# Patient Record
Sex: FEMALE | Race: WHITE | NOT HISPANIC OR LATINO | URBAN - METROPOLITAN AREA
[De-identification: names, ages, dates, MRNs, and addresses within clinical notes are randomized per-mention and may not be internally consistent; named-entity substitution may affect disease eponyms.]

---

## 2020-07-25 ENCOUNTER — TELEPHONE ENCOUNTER (OUTPATIENT)
Dept: URBAN - METROPOLITAN AREA CLINIC 13 | Facility: CLINIC | Age: 65
End: 2020-07-25

## 2020-07-25 RX ORDER — CARISOPRODOL 250 MG/1
TAKE 1 TABLET BEDTIME TABLET ORAL
Refills: 0 | OUTPATIENT
Start: 2007-08-30 | End: 2008-07-28

## 2020-07-25 RX ORDER — POLYETHYLENE GLYCOL 3350 17 G/DOSE
MIX 17 GRAMS IN 8 OUNCES OF WATER AND DRINK ONCE DAILY POWDER (GRAM) ORAL
Refills: 0 | OUTPATIENT
End: 2015-04-03

## 2020-07-25 RX ORDER — LORAZEPAM 0.5 MG/1
TAKE 1 TABLET BY MOUTH TWICE A DAY AS NEEDED FOR ANXIETY TABLET ORAL
Qty: 30 | Refills: 0 | OUTPATIENT
Start: 2011-11-22 | End: 2015-04-03

## 2020-07-25 RX ORDER — PREDNISONE 10 MG/1
TABLET ORAL
Qty: 32 | Refills: 0 | OUTPATIENT
Start: 2015-01-02 | End: 2015-06-09

## 2020-07-25 RX ORDER — LANSOPRAZOLE 30 MG/1
TAKE 1 CAPSULE DAILY CAPSULE, DELAYED RELEASE ORAL
Refills: 0 | OUTPATIENT
End: 2015-06-09

## 2020-07-25 RX ORDER — TRAMADOL HYDROCHLORIDE AND ACETAMINOPHEN 37.5; 325 MG/1; MG/1
TAKE 1-2 TABLETS BY MOUTH EVERY 4-6 HOURS AS NEEDED (DO NOT TAKE MORE TABLET, FILM COATED ORAL
Qty: 90 | Refills: 0 | OUTPATIENT
Start: 2011-06-20 | End: 2015-04-03

## 2020-07-26 ENCOUNTER — TELEPHONE ENCOUNTER (OUTPATIENT)
Dept: URBAN - METROPOLITAN AREA CLINIC 13 | Facility: CLINIC | Age: 65
End: 2020-07-26

## 2020-07-26 RX ORDER — ALENDRONATE SODIUM AND CHOLECALCIFEROL 70; 2800 MG/1; [IU]/1
TABLET ORAL
Qty: 4 | Refills: 0 | Status: ACTIVE | COMMUNITY
Start: 2011-09-27

## 2020-07-26 RX ORDER — PHENAZOPYRIDINE HYDROCHLORIDE 100 MG/1
TAKE ONE CAPSULE BY MOUTH EVERY DAY TABLET, COATED ORAL
Qty: 30 | Refills: 0 | Status: ACTIVE | COMMUNITY
Start: 2011-07-12

## 2020-07-26 RX ORDER — ASPIRIN 325 MG/1
TABLET ORAL
Qty: 60 | Refills: 0 | Status: ACTIVE | COMMUNITY
Start: 2011-12-26

## 2020-07-26 RX ORDER — AMOXICILLIN 500 MG/1
TAKE 1 CAPSULE TWICE A DAY FOR 10 DAYS CAPSULE ORAL
Qty: 20 | Refills: 0 | Status: ACTIVE | COMMUNITY
Start: 2011-02-01

## 2020-07-26 RX ORDER — ALENDRONATE SODIUM 70 MG/1
TABLET ORAL
Qty: 4 | Refills: 0 | Status: ACTIVE | COMMUNITY
Start: 2011-12-05

## 2020-07-26 RX ORDER — FAMOTIDINE 20 MG/1
TAKE 1 TABLET TWICE DAILY TABLET ORAL
Refills: 0 | Status: ACTIVE | COMMUNITY
Start: 2011-05-07

## 2020-07-26 RX ORDER — FLUCONAZOLE 150 MG/1
TABLET ORAL
Qty: 3 | Refills: 0 | Status: ACTIVE | COMMUNITY
Start: 2014-06-10

## 2020-07-26 RX ORDER — ALBUTEROL SULFATE 90 UG/1
USE 2 PUFFS 4 TIMES A DAY AS NEEDED AEROSOL, METERED RESPIRATORY (INHALATION)
Qty: 9 | Refills: 0 | Status: ACTIVE | COMMUNITY
Start: 2011-09-06

## 2020-07-26 RX ORDER — ALENDRONATE SODIUM AND CHOLECALCIFEROL 70; 2800 MG/1; [IU]/1
TABLET ORAL
Qty: 4 | Refills: 0 | Status: ACTIVE | COMMUNITY
Start: 2011-08-31

## 2020-07-26 RX ORDER — AMOXICILLIN 400 MG/5ML
GIVE 6 ML BY MOUTH TWICE A DAY FOR 10 DAYS (DISCARD REMAINDER) POWDER, FOR SUSPENSION ORAL
Qty: 150 | Refills: 0 | Status: ACTIVE | COMMUNITY
Start: 2011-02-08

## 2020-07-26 RX ORDER — ESTRADIOL 0.5 MG/1
TAKE 1 TABLET DAILY TABLET ORAL
Refills: 0 | Status: ACTIVE | COMMUNITY
Start: 2014-06-11

## 2020-07-26 RX ORDER — FAMOTIDINE 20 MG/1
TABLET ORAL
Qty: 90 | Refills: 0 | Status: ACTIVE | COMMUNITY
Start: 2011-02-08

## 2020-07-26 RX ORDER — TRAMADOL HYDROCHLORIDE AND ACETAMINOPHEN 37.5; 325 MG/1; MG/1
TAKE 1-2 TABLETS BY MOUTH EVERY 4-6 HOURS AS NEEDED (DO NOT TAKE MORE TABLET, FILM COATED ORAL
Qty: 90 | Refills: 0 | Status: ACTIVE | COMMUNITY
Start: 2011-06-20

## 2020-07-26 RX ORDER — WARFARIN 2.5 MG/1
TAKE 1 TABLET BY MOUTH EVERY DAY AT 4 PM TABLET ORAL
Qty: 15 | Refills: 0 | Status: ACTIVE | COMMUNITY
Start: 2010-12-31

## 2020-07-26 RX ORDER — ALENDRONATE SODIUM 70 MG/1
TAKE 1 TABLET ONCE WEEKLY TABLET ORAL
Refills: 0 | Status: ACTIVE | COMMUNITY

## 2020-07-26 RX ORDER — VERAPAMIL HYDROCHLORIDE 80 MG/1
TAKE 1 TABLET 3 TIMES DAILY TABLET ORAL
Refills: 0 | Status: ACTIVE | COMMUNITY
Start: 2014-07-01

## 2020-07-26 RX ORDER — ZOLPIDEM TARTRATE 10 MG/1
TAKE 1 TABLET BY MOUTH AT BEDTIME AS NEEDED TABLET, FILM COATED ORAL
Qty: 30 | Refills: 0 | Status: ACTIVE | COMMUNITY
Start: 2011-06-20

## 2020-07-26 RX ORDER — PREDNISONE 10 MG/1
TABLET ORAL
Qty: 32 | Refills: 0 | Status: ACTIVE | COMMUNITY
Start: 2015-01-02

## 2020-07-26 RX ORDER — ZOLPIDEM TARTRATE 10 MG/1
TAKE 1 TABLET DAILY AT BEDTIME TABLET, FILM COATED ORAL
Refills: 0 | Status: ACTIVE | COMMUNITY

## 2020-07-26 RX ORDER — CYANOCOBALAMIN 1000 UG/ML
ADMINISTER IM INJECTION 1ML EVERY 2 WEEKS INJECTION INTRAMUSCULAR; SUBCUTANEOUS
Refills: 0 | Status: ACTIVE | COMMUNITY

## 2020-07-26 RX ORDER — BUTALBITAL, ACETAMINOPHEN, AND CAFFEINE 50; 325; 40 MG/1; MG/1; MG/1
TABLET ORAL
Qty: 30 | Refills: 0 | Status: ACTIVE | COMMUNITY
Start: 2014-09-25

## 2020-07-26 RX ORDER — CYCLOSPORINE 0.5 MG/ML
EMULSION OPHTHALMIC
Qty: 60 | Refills: 0 | Status: ACTIVE | COMMUNITY
Start: 2011-09-01

## 2020-07-26 RX ORDER — OXYCODONE AND ACETAMINOPHEN 10; 325 MG/1; MG/1
TABLET ORAL
Qty: 60 | Refills: 0 | Status: ACTIVE | COMMUNITY
Start: 2011-12-26

## 2020-07-26 RX ORDER — OXYCODONE AND ACETAMINOPHEN 5; 325 MG/1; MG/1
TAKE 1 OR 2 TABLETS BY MOUTH EVERY 6 TO 8 HOURS TABLET ORAL
Qty: 40 | Refills: 0 | Status: ACTIVE | COMMUNITY
Start: 2012-01-05

## 2020-07-26 RX ORDER — DULOXETINE HCL 30 MG
TAKE ONE CAPSULE BY MOUTH EVERY DAY CAPSULE,DELAYED RELEASE (ENTERIC COATED) ORAL
Qty: 30 | Refills: 0 | Status: ACTIVE | COMMUNITY
Start: 2011-08-15

## 2020-07-26 RX ORDER — POLYETHYLENE GLYCOL 3350, SODIUM CHLORIDE, SODIUM BICARBONATE AND POTASSIUM CHLORIDE WITH LEMON FLAVOR 420; 11.2; 5.72; 1.48 G/4L; G/4L; G/4L; G/4L
POWDER, FOR SOLUTION ORAL
Qty: 255 | Refills: 0 | Status: ACTIVE | COMMUNITY
Start: 2012-01-24

## 2020-07-26 RX ORDER — FLUTICASONE PROPIONATE AND SALMETEROL 50; 250 UG/1; UG/1
USE 1 PUFF TWICE A DAY POWDER RESPIRATORY (INHALATION)
Qty: 60 | Refills: 0 | Status: ACTIVE | COMMUNITY
Start: 2011-09-06

## 2020-07-26 RX ORDER — MELOXICAM 15 MG/1
TAKE 1 TABLET BY MOUTH EVERY DAY TABLET ORAL
Qty: 30 | Refills: 0 | Status: ACTIVE | COMMUNITY
Start: 2011-11-22

## 2020-07-26 RX ORDER — NEOMYCIN SULFATE, POLYMYXIN B SULFATE AND HYDROCORTISONE 3.5; 10000; 1 MG/ML; [IU]/ML; MG/ML
SOLUTION AURICULAR (OTIC)
Qty: 10 | Refills: 0 | Status: ACTIVE | COMMUNITY
Start: 2015-06-05

## 2020-07-26 RX ORDER — DICLOFENAC SODIUM 1 %
APPLY 4 GRAMS TO NECK 4 TIMES A DAY AS NEEDED GEL (GRAM) TOPICAL
Qty: 100 | Refills: 0 | Status: ACTIVE | COMMUNITY
Start: 2011-08-23

## 2020-07-26 RX ORDER — CARISOPRODOL 350 MG/1
TAKE 1 TABLET BY MOUTH AT BEDTIME TABLET ORAL
Qty: 30 | Refills: 0 | Status: ACTIVE | COMMUNITY
Start: 2011-02-01

## 2020-07-26 RX ORDER — AZITHROMYCIN DIHYDRATE 250 MG/1
TAKE 2 TABLETS BY MOUTH TODAY, THEN TAKE 1 TABLET DAILY FOR 4 DAYS TABLET, FILM COATED ORAL
Qty: 6 | Refills: 0 | Status: ACTIVE | COMMUNITY
Start: 2011-04-13

## 2020-07-26 RX ORDER — LEVOTHYROXINE SODIUM 0.17 MG/1
TAKE 1 TABLET BY MOUTH EVERY MORNING ON EMPTY STOMACH TABLET ORAL
Qty: 30 | Refills: 0 | Status: ACTIVE | COMMUNITY
Start: 2011-08-17

## 2020-07-26 RX ORDER — CEPHALEXIN 500 MG/1
CAPSULE ORAL
Qty: 30 | Refills: 0 | Status: ACTIVE | COMMUNITY
Start: 2015-08-03

## 2020-07-26 RX ORDER — HYDROCODONE/ACETAMINOPHEN 10MG-325MG
TAKE 1 TO 2 TABLETS EVERY 6 HOURS AS NEEDED FOR PAIN TABLET ORAL
Refills: 0 | Status: ACTIVE | COMMUNITY

## 2020-07-26 RX ORDER — BROMPHENIRAMINE MALEATE, PSEUDOEPHEDRINE HYDROCHLORIDE, 2; 30; 10 MG/5ML; MG/5ML; MG/5ML
SYRUP ORAL
Qty: 120 | Refills: 0 | Status: ACTIVE | COMMUNITY
Start: 2014-12-17

## 2020-07-26 RX ORDER — FLUTICASONE PROPIONATE 50 UG/1
SPRAY, METERED NASAL
Qty: 16 | Refills: 0 | Status: ACTIVE | COMMUNITY
Start: 2015-02-19

## 2020-07-26 RX ORDER — LEVOFLOXACIN 500 MG/1
TABLET, FILM COATED ORAL
Qty: 10 | Refills: 0 | Status: ACTIVE | COMMUNITY
Start: 2014-12-17

## 2020-07-26 RX ORDER — LEVOTHYROXINE SODIUM 0.15 MG/1
TAKE 1 TABLET BY MOUTH EVERY MORNING ON EMPTY STOMACH TABLET ORAL
Qty: 30 | Refills: 0 | Status: ACTIVE | COMMUNITY
Start: 2011-02-28

## 2020-07-26 RX ORDER — HYDROCODONE BITARTRATE AND ACETAMINOPHEN 10; 325 MG/1; MG/1
TAKE 1 TO 2 TABLETS BY MOUTH EVERY 4 TO 6 HOURS AS NEEDED FOR PAIN TABLET ORAL
Qty: 40 | Refills: 0 | Status: ACTIVE | COMMUNITY
Start: 2011-01-14

## 2020-07-26 RX ORDER — BUTALBITAL, ACETAMINOPHEN, AND CAFFEINE 50; 325; 40 MG/1; MG/1; MG/1
TABLET ORAL
Qty: 30 | Refills: 0 | Status: ACTIVE | COMMUNITY
Start: 2014-10-25

## 2020-07-26 RX ORDER — HYDROCODONE BITARTRATE AND ACETAMINOPHEN 7.5; 325 MG/1; MG/1
TAKE 1-2 TABLETS BY MOUTH EVERY 4-6 HOURS AS NEEDED TABLET ORAL
Qty: 40 | Refills: 0 | Status: ACTIVE | COMMUNITY
Start: 2012-01-02

## 2020-07-26 RX ORDER — PROMETHAZINE HYDROCHLORIDE 25 MG/1
TAKE 1 TABLET DAILY PRN TABLET ORAL
Qty: 10 | Refills: 0 | Status: ACTIVE | COMMUNITY
Start: 2015-01-05

## 2020-07-26 RX ORDER — IBANDRONATE SODIUM 150 MG
TABLET ORAL
Qty: 1 | Refills: 0 | Status: ACTIVE | COMMUNITY
Start: 2011-11-30

## 2021-07-26 ENCOUNTER — LAB OUTSIDE AN ENCOUNTER (OUTPATIENT)
Dept: URBAN - METROPOLITAN AREA CLINIC 113 | Facility: CLINIC | Age: 66
End: 2021-07-26

## 2021-07-26 ENCOUNTER — TELEPHONE ENCOUNTER (OUTPATIENT)
Dept: URBAN - METROPOLITAN AREA CLINIC 113 | Facility: CLINIC | Age: 66
End: 2021-07-26

## 2021-07-26 VITALS — WEIGHT: 140 LBS | BODY MASS INDEX: 23.9 KG/M2 | HEIGHT: 64 IN

## 2021-07-26 RX ORDER — POLYETHYLENE GLYCOL 3350, SODIUM CHLORIDE, SODIUM BICARBONATE AND POTASSIUM CHLORIDE WITH LEMON FLAVOR 420; 11.2; 5.72; 1.48 G/4L; G/4L; G/4L; G/4L
POWDER, FOR SOLUTION ORAL
Qty: 255 | Refills: 0 | Status: DISCONTINUED | COMMUNITY
Start: 2012-01-24

## 2021-07-26 RX ORDER — FAMOTIDINE 20 MG/1
TABLET ORAL
Qty: 90 | Refills: 0 | Status: ACTIVE | COMMUNITY
Start: 2011-02-08

## 2021-07-26 RX ORDER — ESTRADIOL 0.5 MG/1
TAKE 1 TABLET DAILY TABLET ORAL
Refills: 0 | Status: ACTIVE | COMMUNITY
Start: 2014-06-11

## 2021-07-26 RX ORDER — LEVOFLOXACIN 500 MG/1
TABLET, FILM COATED ORAL
Qty: 10 | Refills: 0 | Status: DISCONTINUED | COMMUNITY
Start: 2014-12-17

## 2021-07-26 RX ORDER — PREDNISONE 10 MG/1
TABLET ORAL
Qty: 32 | Refills: 0 | Status: DISCONTINUED | COMMUNITY
Start: 2015-01-02

## 2021-07-26 RX ORDER — AMOXICILLIN 400 MG/5ML
GIVE 6 ML BY MOUTH TWICE A DAY FOR 10 DAYS (DISCARD REMAINDER) POWDER, FOR SUSPENSION ORAL
Qty: 150 | Refills: 0 | Status: DISCONTINUED | COMMUNITY
Start: 2011-02-08

## 2021-07-26 RX ORDER — FLUCONAZOLE 150 MG/1
TABLET ORAL
Qty: 3 | Refills: 0 | Status: DISCONTINUED | COMMUNITY
Start: 2014-06-10

## 2021-07-26 RX ORDER — ALBUTEROL SULFATE 90 UG/1
USE 2 PUFFS 4 TIMES A DAY AS NEEDED AEROSOL, METERED RESPIRATORY (INHALATION)
Qty: 9 | Refills: 0 | Status: DISCONTINUED | COMMUNITY
Start: 2011-09-06

## 2021-07-26 RX ORDER — BUTALBITAL, ACETAMINOPHEN, AND CAFFEINE 50; 325; 40 MG/1; MG/1; MG/1
TABLET ORAL
Qty: 30 | Refills: 0 | Status: DISCONTINUED | COMMUNITY
Start: 2014-09-25

## 2021-07-26 RX ORDER — PROMETHAZINE HYDROCHLORIDE 25 MG/1
TAKE 1 TABLET DAILY PRN TABLET ORAL
Qty: 10 | Refills: 0 | Status: DISCONTINUED | COMMUNITY
Start: 2015-01-05

## 2021-07-26 RX ORDER — VERAPAMIL HYDROCHLORIDE 80 MG/1
TAKE 1 TABLET 3 TIMES DAILY TABLET ORAL
Refills: 0 | Status: ACTIVE | COMMUNITY
Start: 2014-07-01

## 2021-07-26 RX ORDER — CYANOCOBALAMIN 1000 UG/ML
ADMINISTER IM INJECTION 1ML EVERY 2 WEEKS INJECTION INTRAMUSCULAR; SUBCUTANEOUS
Refills: 0 | Status: DISCONTINUED | COMMUNITY

## 2021-07-26 RX ORDER — AMOXICILLIN 500 MG/1
TAKE 1 CAPSULE TWICE A DAY FOR 10 DAYS CAPSULE ORAL
Qty: 20 | Refills: 0 | Status: DISCONTINUED | COMMUNITY
Start: 2011-02-01

## 2021-07-26 RX ORDER — GABAPENTIN 300 MG/1
5 CAPSULE CAPSULE ORAL ONCE A DAY
Status: ACTIVE | COMMUNITY

## 2021-07-26 RX ORDER — ERGOCALCIFEROL 1.25 MG/1
2 CAPSULE CAPSULE ORAL
Status: ACTIVE | COMMUNITY

## 2021-07-26 RX ORDER — FLUTICASONE PROPIONATE AND SALMETEROL 50; 250 UG/1; UG/1
USE 1 PUFF TWICE A DAY POWDER RESPIRATORY (INHALATION)
Qty: 60 | Refills: 0 | Status: DISCONTINUED | COMMUNITY
Start: 2011-09-06

## 2021-07-26 RX ORDER — AZITHROMYCIN DIHYDRATE 250 MG/1
TAKE 2 TABLETS BY MOUTH TODAY, THEN TAKE 1 TABLET DAILY FOR 4 DAYS TABLET, FILM COATED ORAL
Qty: 6 | Refills: 0 | Status: DISCONTINUED | COMMUNITY
Start: 2011-04-13

## 2021-07-26 RX ORDER — ALENDRONATE SODIUM AND CHOLECALCIFEROL 70; 2800 MG/1; [IU]/1
TABLET ORAL
Qty: 4 | Refills: 0 | Status: DISCONTINUED | COMMUNITY
Start: 2011-08-31

## 2021-07-26 RX ORDER — OXYCODONE AND ACETAMINOPHEN 10; 325 MG/1; MG/1
TABLET ORAL
Qty: 60 | Refills: 0 | Status: DISCONTINUED | COMMUNITY
Start: 2011-12-26

## 2021-07-26 RX ORDER — DULOXETINE HCL 30 MG
TAKE ONE CAPSULE BY MOUTH EVERY DAY CAPSULE,DELAYED RELEASE (ENTERIC COATED) ORAL
Qty: 30 | Refills: 0 | Status: DISCONTINUED | COMMUNITY
Start: 2011-08-15

## 2021-07-26 RX ORDER — CEPHALEXIN 500 MG/1
CAPSULE ORAL
Qty: 30 | Refills: 0 | Status: DISCONTINUED | COMMUNITY
Start: 2015-08-03

## 2021-07-26 RX ORDER — ZOLPIDEM TARTRATE 10 MG/1
TAKE 1 TABLET DAILY AT BEDTIME TABLET, FILM COATED ORAL
Refills: 0 | Status: ACTIVE | COMMUNITY

## 2021-07-26 RX ORDER — NEOMYCIN SULFATE, POLYMYXIN B SULFATE AND HYDROCORTISONE 3.5; 10000; 1 MG/ML; [IU]/ML; MG/ML
SOLUTION AURICULAR (OTIC)
Qty: 10 | Refills: 0 | Status: DISCONTINUED | COMMUNITY
Start: 2015-06-05

## 2021-07-26 RX ORDER — HYDROCODONE BITARTRATE AND ACETAMINOPHEN 10; 325 MG/1; MG/1
TAKE 1 TO 2 TABLETS BY MOUTH EVERY 4 TO 6 HOURS AS NEEDED FOR PAIN TABLET ORAL
Qty: 40 | Refills: 0 | Status: DISCONTINUED | COMMUNITY
Start: 2011-01-14

## 2021-07-26 RX ORDER — TRAMADOL HYDROCHLORIDE AND ACETAMINOPHEN 37.5; 325 MG/1; MG/1
TAKE 1-2 TABLETS BY MOUTH EVERY 4-6 HOURS AS NEEDED (DO NOT TAKE MORE TABLET, FILM COATED ORAL
Qty: 90 | Refills: 0 | Status: DISCONTINUED | COMMUNITY
Start: 2011-06-20

## 2021-07-26 RX ORDER — OXYCODONE AND ACETAMINOPHEN 5; 325 MG/1; MG/1
TAKE 1 OR 2 TABLETS BY MOUTH EVERY 6 TO 8 HOURS TABLET ORAL
Qty: 40 | Refills: 0 | Status: DISCONTINUED | COMMUNITY
Start: 2012-01-05

## 2021-07-26 RX ORDER — PHENAZOPYRIDINE HYDROCHLORIDE 100 MG/1
TAKE ONE CAPSULE BY MOUTH EVERY DAY TABLET, COATED ORAL
Qty: 30 | Refills: 0 | Status: DISCONTINUED | COMMUNITY
Start: 2011-07-12

## 2021-07-26 RX ORDER — HYDROCODONE/ACETAMINOPHEN 10MG-325MG
TAKE 1 TO 2 TABLETS EVERY 6 HOURS AS NEEDED FOR PAIN TABLET ORAL
Refills: 0 | Status: ACTIVE | COMMUNITY

## 2021-07-26 RX ORDER — WARFARIN 2.5 MG/1
TAKE 1 TABLET BY MOUTH EVERY DAY AT 4 PM TABLET ORAL
Qty: 15 | Refills: 0 | Status: DISCONTINUED | COMMUNITY
Start: 2010-12-31

## 2021-07-26 RX ORDER — ESCITALOPRAM 20 MG/1
1 TABLET TABLET, FILM COATED ORAL ONCE A DAY
Status: ACTIVE | COMMUNITY

## 2021-07-26 RX ORDER — FLUTICASONE PROPIONATE 50 UG/1
SPRAY, METERED NASAL
Qty: 16 | Refills: 0 | Status: DISCONTINUED | COMMUNITY
Start: 2015-02-19

## 2021-07-26 RX ORDER — LEVOTHYROXINE SODIUM 0.17 MG/1
TAKE 1 TABLET BY MOUTH EVERY MORNING ON EMPTY STOMACH TABLET ORAL
Qty: 30 | Refills: 0 | Status: DISCONTINUED | COMMUNITY
Start: 2011-08-17

## 2021-07-26 RX ORDER — SODIUM, POTASSIUM,MAG SULFATES 17.5-3.13G
354ML SOLUTION, RECONSTITUTED, ORAL ORAL
Qty: 354 MILLILITER | Refills: 0 | OUTPATIENT
Start: 2021-07-26 | End: 2021-07-27

## 2021-07-26 RX ORDER — ALENDRONATE SODIUM 70 MG/1
TAKE 1 TABLET ONCE WEEKLY TABLET ORAL
Refills: 0 | Status: DISCONTINUED | COMMUNITY

## 2021-07-26 RX ORDER — CARISOPRODOL 350 MG/1
TAKE 1 TABLET BY MOUTH AT BEDTIME TABLET ORAL
Qty: 30 | Refills: 0 | Status: ACTIVE | COMMUNITY
Start: 2011-02-01

## 2021-07-26 RX ORDER — DICLOFENAC SODIUM 1 %
APPLY 4 GRAMS TO NECK 4 TIMES A DAY AS NEEDED GEL (GRAM) TOPICAL
Qty: 100 | Refills: 0 | Status: DISCONTINUED | COMMUNITY
Start: 2011-08-23

## 2021-07-26 RX ORDER — HYDROCODONE BITARTRATE AND ACETAMINOPHEN 7.5; 325 MG/1; MG/1
TAKE 1-2 TABLETS BY MOUTH EVERY 4-6 HOURS AS NEEDED TABLET ORAL
Qty: 40 | Refills: 0 | Status: DISCONTINUED | COMMUNITY
Start: 2012-01-02

## 2021-07-26 RX ORDER — LEVOTHYROXINE SODIUM 0.15 MG/1
TAKE 1 TABLET BY MOUTH EVERY MORNING ON EMPTY STOMACH TABLET ORAL
Qty: 30 | Refills: 0 | Status: ACTIVE | COMMUNITY
Start: 2011-02-28

## 2021-07-26 RX ORDER — BROMPHENIRAMINE MALEATE, PSEUDOEPHEDRINE HYDROCHLORIDE, 2; 30; 10 MG/5ML; MG/5ML; MG/5ML
SYRUP ORAL
Qty: 120 | Refills: 0 | Status: DISCONTINUED | COMMUNITY
Start: 2014-12-17

## 2021-07-26 RX ORDER — IBANDRONATE SODIUM 150 MG
TABLET ORAL
Qty: 1 | Refills: 0 | Status: DISCONTINUED | COMMUNITY
Start: 2011-11-30

## 2021-07-26 RX ORDER — CYCLOSPORINE 0.5 MG/ML
EMULSION OPHTHALMIC
Qty: 60 | Refills: 0 | Status: DISCONTINUED | COMMUNITY
Start: 2011-09-01

## 2021-07-26 RX ORDER — ASPIRIN 325 MG/1
TABLET ORAL
Qty: 60 | Refills: 0 | Status: DISCONTINUED | COMMUNITY
Start: 2011-12-26

## 2021-07-26 RX ORDER — MELOXICAM 15 MG/1
TAKE 1 TABLET BY MOUTH EVERY DAY TABLET ORAL
Qty: 30 | Refills: 0 | Status: DISCONTINUED | COMMUNITY
Start: 2011-11-22

## 2021-08-23 ENCOUNTER — OFFICE VISIT (OUTPATIENT)
Dept: URBAN - METROPOLITAN AREA SURGERY CENTER 25 | Facility: SURGERY CENTER | Age: 66
End: 2021-08-23

## 2021-08-23 RX ORDER — GABAPENTIN 300 MG/1
5 CAPSULE CAPSULE ORAL ONCE A DAY
Status: ACTIVE | COMMUNITY

## 2021-08-23 RX ORDER — ERGOCALCIFEROL 1.25 MG/1
2 CAPSULE CAPSULE ORAL
Status: ACTIVE | COMMUNITY

## 2021-08-23 RX ORDER — CARISOPRODOL 350 MG/1
TAKE 1 TABLET BY MOUTH AT BEDTIME TABLET ORAL
Qty: 30 | Refills: 0 | Status: ACTIVE | COMMUNITY
Start: 2011-02-01

## 2021-08-23 RX ORDER — LEVOTHYROXINE SODIUM 0.15 MG/1
TAKE 1 TABLET BY MOUTH EVERY MORNING ON EMPTY STOMACH TABLET ORAL
Qty: 30 | Refills: 0 | Status: ACTIVE | COMMUNITY
Start: 2011-02-28

## 2021-08-23 RX ORDER — FAMOTIDINE 20 MG/1
TABLET ORAL
Qty: 90 | Refills: 0 | Status: ACTIVE | COMMUNITY
Start: 2011-02-08

## 2021-08-23 RX ORDER — VERAPAMIL HYDROCHLORIDE 80 MG/1
TAKE 1 TABLET 3 TIMES DAILY TABLET ORAL
Refills: 0 | Status: ACTIVE | COMMUNITY
Start: 2014-07-01

## 2021-08-23 RX ORDER — ESCITALOPRAM 20 MG/1
1 TABLET TABLET, FILM COATED ORAL ONCE A DAY
Status: ACTIVE | COMMUNITY

## 2021-08-23 RX ORDER — HYDROCODONE/ACETAMINOPHEN 10MG-325MG
TAKE 1 TO 2 TABLETS EVERY 6 HOURS AS NEEDED FOR PAIN TABLET ORAL
Refills: 0 | Status: ACTIVE | COMMUNITY

## 2021-08-23 RX ORDER — ESTRADIOL 0.5 MG/1
TAKE 1 TABLET DAILY TABLET ORAL
Refills: 0 | Status: ACTIVE | COMMUNITY
Start: 2014-06-11

## 2021-08-23 RX ORDER — ZOLPIDEM TARTRATE 10 MG/1
TAKE 1 TABLET DAILY AT BEDTIME TABLET, FILM COATED ORAL
Refills: 0 | Status: ACTIVE | COMMUNITY

## 2021-08-27 ENCOUNTER — TELEPHONE ENCOUNTER (OUTPATIENT)
Dept: URBAN - METROPOLITAN AREA CLINIC 113 | Facility: CLINIC | Age: 66
End: 2021-08-27

## 2021-09-21 ENCOUNTER — OFFICE VISIT (OUTPATIENT)
Dept: URBAN - METROPOLITAN AREA SURGERY CENTER 25 | Facility: SURGERY CENTER | Age: 66
End: 2021-09-21

## 2021-09-30 ENCOUNTER — OFFICE VISIT (OUTPATIENT)
Dept: URBAN - METROPOLITAN AREA CLINIC 107 | Facility: CLINIC | Age: 66
End: 2021-09-30

## 2021-10-12 ENCOUNTER — OFFICE VISIT (OUTPATIENT)
Dept: URBAN - METROPOLITAN AREA CLINIC 107 | Facility: CLINIC | Age: 66
End: 2021-10-12

## 2022-11-02 ENCOUNTER — LAB OUTSIDE AN ENCOUNTER (OUTPATIENT)
Dept: URBAN - METROPOLITAN AREA CLINIC 107 | Facility: CLINIC | Age: 67
End: 2022-11-02

## 2022-11-02 ENCOUNTER — OFFICE VISIT (OUTPATIENT)
Dept: URBAN - METROPOLITAN AREA CLINIC 107 | Facility: CLINIC | Age: 67
End: 2022-11-02
Payer: MEDICARE

## 2022-11-02 VITALS
SYSTOLIC BLOOD PRESSURE: 130 MMHG | RESPIRATION RATE: 18 BRPM | TEMPERATURE: 98.2 F | WEIGHT: 150 LBS | HEIGHT: 64 IN | DIASTOLIC BLOOD PRESSURE: 84 MMHG | BODY MASS INDEX: 25.61 KG/M2 | HEART RATE: 77 BPM

## 2022-11-02 DIAGNOSIS — R13.19 ESOPHAGEAL DYSPHAGIA: ICD-10-CM

## 2022-11-02 DIAGNOSIS — Z80.0 FAMILY HISTORY OF MALIGNANT NEOPLASM OF DIGESTIVE ORGANS: ICD-10-CM

## 2022-11-02 DIAGNOSIS — Z12.11 COLON CANCER SCREENING: ICD-10-CM

## 2022-11-02 DIAGNOSIS — R10.13 EPIGASTRIC ABDOMINAL PAIN: ICD-10-CM

## 2022-11-02 PROCEDURE — 99204 OFFICE O/P NEW MOD 45 MIN: CPT | Performed by: INTERNAL MEDICINE

## 2022-11-02 RX ORDER — HYDROCODONE/ACETAMINOPHEN 10MG-325MG
TAKE 1 TO 2 TABLETS EVERY 6 HOURS AS NEEDED FOR PAIN TABLET ORAL
Refills: 0 | Status: ACTIVE | COMMUNITY

## 2022-11-02 RX ORDER — SUCRALFATE 1 G/1
1 TABLET ON AN EMPTY STOMACH TABLET ORAL TWICE A DAY
OUTPATIENT

## 2022-11-02 RX ORDER — CARISOPRODOL 350 MG/1
TAKE 1 TABLET BY MOUTH AT BEDTIME TABLET ORAL TWICE A DAY
Refills: 0 | Status: ACTIVE | COMMUNITY
Start: 2011-02-01

## 2022-11-02 RX ORDER — LEVOTHYROXINE SODIUM 50 UG/1
1 TABLET IN THE MORNING ON AN EMPTY STOMACH TABLET ORAL ONCE A DAY
Status: ACTIVE | COMMUNITY

## 2022-11-02 RX ORDER — VERAPAMIL HYDROCHLORIDE 80 MG/1
TAKE 1 TABLET 3 TIMES DAILY TABLET ORAL
Refills: 0 | Status: ACTIVE | COMMUNITY
Start: 2014-07-01

## 2022-11-02 RX ORDER — GABAPENTIN 300 MG/1
5 CAPSULE CAPSULE ORAL ONCE A DAY
Status: ACTIVE | COMMUNITY

## 2022-11-02 RX ORDER — ERGOCALCIFEROL 1.25 MG/1
2 CAPSULE CAPSULE ORAL
Status: ACTIVE | COMMUNITY

## 2022-11-02 RX ORDER — LEVOTHYROXINE SODIUM 112 UG/1
TAKE 1 TABLET BY MOUTH EVERY MORNING ON EMPTY STOMACH TABLET ORAL
Qty: 30 | Refills: 0 | Status: ACTIVE | COMMUNITY
Start: 2011-02-28

## 2022-11-02 RX ORDER — FAMOTIDINE 40 MG/1
ONE TABLET TABLET, FILM COATED ORAL TWICE A DAY
Refills: 0 | Status: ACTIVE | COMMUNITY
Start: 2011-02-08

## 2022-11-02 RX ORDER — FAMOTIDINE 40 MG/1
ONE TABLET TABLET, FILM COATED ORAL TWICE A DAY
OUTPATIENT
Start: 2011-02-08

## 2022-11-02 RX ORDER — PANTOPRAZOLE SODIUM 40 MG/1
1 TABLET TABLET, DELAYED RELEASE ORAL ONCE A DAY
OUTPATIENT

## 2022-11-02 RX ORDER — FLUTICASONE PROPIONATE 50 UG/1
1 SPRAY IN EACH NOSTRIL SPRAY, METERED NASAL ONCE A DAY
Status: ACTIVE | COMMUNITY

## 2022-11-02 RX ORDER — SUCRALFATE 1 G/1
1 TABLET ON AN EMPTY STOMACH TABLET ORAL TWICE A DAY
Status: ACTIVE | COMMUNITY

## 2022-11-02 RX ORDER — VENLAFAXINE HYDROCHLORIDE 75 MG/1
1 CAPSULE WITH FOOD CAPSULE, EXTENDED RELEASE ORAL ONCE A DAY
Status: ACTIVE | COMMUNITY

## 2022-11-02 RX ORDER — ESTRADIOL 0.5 MG/1
TAKE 1 TABLET DAILY TABLET ORAL
Refills: 0 | Status: ACTIVE | COMMUNITY
Start: 2014-06-11

## 2022-11-02 RX ORDER — PANTOPRAZOLE SODIUM 40 MG/1
1 TABLET TABLET, DELAYED RELEASE ORAL ONCE A DAY
Status: ACTIVE | COMMUNITY

## 2022-11-02 NOTE — HPI-TODAY'S VISIT:
Ms. Chen is a 67-year-old woman with a history of morbid obesity status post Jhon-en-Y gastric bypass presenting for evaluation regarding colorectal cancer screening and GERD.  She reports over the past 6 months having progressively worse upper abdominal discomfort and bloating that is worse after meals.  She has fairly constant nausea and occasional episodes of nonbloody emesis.  She also reports difficulty swallowing solids and some liquids.  She denies any NSAID use.  No weight loss.  Her bowel habits are fairly regular normal consistency.  No melena or red blood per rectum.  Her family history is notable for mother with colorectal cancer at age > 60.  The colonoscopy findings included removal of diminutive rectal hyperplastic polyp and significant colon looping.  She also had an EGD on 9/13/2007 for evaluation of dysphagia is notable for small hiatal hernia, no esophageal mucosal abnormality to explain dysphagia, normal stomach, normal duodenum.

## 2022-11-10 ENCOUNTER — OFFICE VISIT (OUTPATIENT)
Dept: URBAN - METROPOLITAN AREA SURGERY CENTER 25 | Facility: SURGERY CENTER | Age: 67
End: 2022-11-10

## 2022-11-16 ENCOUNTER — OFFICE VISIT (OUTPATIENT)
Dept: URBAN - METROPOLITAN AREA SURGERY CENTER 25 | Facility: SURGERY CENTER | Age: 67
End: 2022-11-16

## 2022-11-17 PROBLEM — 79922009: Status: ACTIVE | Noted: 2022-11-02

## 2022-11-17 PROBLEM — 235595009: Status: ACTIVE | Noted: 2022-11-02

## 2022-11-17 PROBLEM — 40890009: Status: ACTIVE | Noted: 2022-11-02

## 2022-11-17 PROBLEM — 305058001: Status: ACTIVE | Noted: 2022-11-02

## 2022-11-17 PROBLEM — 429006005 FAMILY HISTORY OF MALIGNANT NEOPLASM OF GASTROINTESTINAL TRACT: Status: ACTIVE | Noted: 2022-11-17

## 2022-12-15 ENCOUNTER — OFFICE VISIT (OUTPATIENT)
Dept: URBAN - METROPOLITAN AREA CLINIC 107 | Facility: CLINIC | Age: 67
End: 2022-12-15

## 2022-12-27 ENCOUNTER — CLAIMS CREATED FROM THE CLAIM WINDOW (OUTPATIENT)
Dept: URBAN - METROPOLITAN AREA CLINIC 4 | Facility: CLINIC | Age: 67
End: 2022-12-27
Payer: MEDICARE

## 2022-12-27 ENCOUNTER — OFFICE VISIT (OUTPATIENT)
Dept: URBAN - METROPOLITAN AREA SURGERY CENTER 25 | Facility: SURGERY CENTER | Age: 67
End: 2022-12-27
Payer: MEDICARE

## 2022-12-27 DIAGNOSIS — K31.89 OTHER DISEASES OF STOMACH AND DUODENUM: ICD-10-CM

## 2022-12-27 DIAGNOSIS — R10.13 ABDOMINAL PAIN, EPIGASTRIC: ICD-10-CM

## 2022-12-27 DIAGNOSIS — R13.10 DYSPHAGIA: ICD-10-CM

## 2022-12-27 DIAGNOSIS — K21.9 GASTROESOPHAGEAL REFLUX DISEASE: ICD-10-CM

## 2022-12-27 DIAGNOSIS — Z98.84 H/O GASTRIC BYPASS: ICD-10-CM

## 2022-12-27 PROCEDURE — 43248 EGD GUIDE WIRE INSERTION: CPT | Performed by: INTERNAL MEDICINE

## 2022-12-27 PROCEDURE — 88305 TISSUE EXAM BY PATHOLOGIST: CPT | Performed by: PATHOLOGY

## 2022-12-27 PROCEDURE — 43239 EGD BIOPSY SINGLE/MULTIPLE: CPT | Performed by: INTERNAL MEDICINE

## 2022-12-27 PROCEDURE — G8907 PT DOC NO EVENTS ON DISCHARG: HCPCS | Performed by: INTERNAL MEDICINE

## 2022-12-27 RX ORDER — HYDROCODONE/ACETAMINOPHEN 10MG-325MG
TAKE 1 TO 2 TABLETS EVERY 6 HOURS AS NEEDED FOR PAIN TABLET ORAL
Refills: 0 | Status: ACTIVE | COMMUNITY

## 2022-12-27 RX ORDER — SUCRALFATE 1 G/1
1 TABLET ON AN EMPTY STOMACH TABLET ORAL TWICE A DAY
Status: ACTIVE | COMMUNITY

## 2022-12-27 RX ORDER — FLUTICASONE PROPIONATE 50 UG/1
1 SPRAY IN EACH NOSTRIL SPRAY, METERED NASAL ONCE A DAY
Status: ACTIVE | COMMUNITY

## 2022-12-27 RX ORDER — ERGOCALCIFEROL 1.25 MG/1
2 CAPSULE CAPSULE ORAL
Status: ACTIVE | COMMUNITY

## 2022-12-27 RX ORDER — VENLAFAXINE HYDROCHLORIDE 75 MG/1
1 CAPSULE WITH FOOD CAPSULE, EXTENDED RELEASE ORAL ONCE A DAY
Status: ACTIVE | COMMUNITY

## 2022-12-27 RX ORDER — GABAPENTIN 300 MG/1
5 CAPSULE CAPSULE ORAL ONCE A DAY
Status: ACTIVE | COMMUNITY

## 2022-12-27 RX ORDER — ESTRADIOL 0.5 MG/1
TAKE 1 TABLET DAILY TABLET ORAL
Refills: 0 | Status: ACTIVE | COMMUNITY
Start: 2014-06-11

## 2022-12-27 RX ORDER — PANTOPRAZOLE SODIUM 40 MG/1
1 TABLET TABLET, DELAYED RELEASE ORAL ONCE A DAY
Status: ACTIVE | COMMUNITY

## 2022-12-27 RX ORDER — VERAPAMIL HYDROCHLORIDE 80 MG/1
TAKE 1 TABLET 3 TIMES DAILY TABLET ORAL
Refills: 0 | Status: ACTIVE | COMMUNITY
Start: 2014-07-01

## 2022-12-27 RX ORDER — LEVOTHYROXINE SODIUM 50 UG/1
1 TABLET IN THE MORNING ON AN EMPTY STOMACH TABLET ORAL ONCE A DAY
Status: ACTIVE | COMMUNITY

## 2022-12-27 RX ORDER — FAMOTIDINE 40 MG/1
ONE TABLET TABLET, FILM COATED ORAL TWICE A DAY
Status: ACTIVE | COMMUNITY
Start: 2011-02-08

## 2022-12-27 RX ORDER — CARISOPRODOL 350 MG/1
TAKE 1 TABLET BY MOUTH AT BEDTIME TABLET ORAL TWICE A DAY
Refills: 0 | Status: ACTIVE | COMMUNITY
Start: 2011-02-01

## 2022-12-27 RX ORDER — LEVOTHYROXINE SODIUM 112 UG/1
TAKE 1 TABLET BY MOUTH EVERY MORNING ON EMPTY STOMACH TABLET ORAL
Qty: 30 | Refills: 0 | Status: ACTIVE | COMMUNITY
Start: 2011-02-28

## 2023-01-12 ENCOUNTER — OFFICE VISIT (OUTPATIENT)
Dept: URBAN - METROPOLITAN AREA CLINIC 107 | Facility: CLINIC | Age: 68
End: 2023-01-12

## 2023-01-19 ENCOUNTER — DASHBOARD ENCOUNTERS (OUTPATIENT)
Age: 68
End: 2023-01-19

## 2023-01-19 ENCOUNTER — LAB OUTSIDE AN ENCOUNTER (OUTPATIENT)
Dept: URBAN - METROPOLITAN AREA CLINIC 107 | Facility: CLINIC | Age: 68
End: 2023-01-19

## 2023-01-19 ENCOUNTER — OFFICE VISIT (OUTPATIENT)
Dept: URBAN - METROPOLITAN AREA CLINIC 107 | Facility: CLINIC | Age: 68
End: 2023-01-19
Payer: MEDICARE

## 2023-01-19 VITALS
BODY MASS INDEX: 26.8 KG/M2 | HEIGHT: 64 IN | TEMPERATURE: 97.5 F | SYSTOLIC BLOOD PRESSURE: 116 MMHG | WEIGHT: 157 LBS | HEART RATE: 70 BPM | RESPIRATION RATE: 18 BRPM | DIASTOLIC BLOOD PRESSURE: 80 MMHG

## 2023-01-19 DIAGNOSIS — R10.11 RUQ PAIN: ICD-10-CM

## 2023-01-19 DIAGNOSIS — R09.82 POST-NASAL DRIP: ICD-10-CM

## 2023-01-19 DIAGNOSIS — Z86.010 PERSONAL HISTORY OF COLONIC POLYPS: ICD-10-CM

## 2023-01-19 DIAGNOSIS — Z80.0 FAMILY HISTORY OF COLON CANCER IN MOTHER: ICD-10-CM

## 2023-01-19 PROCEDURE — 99214 OFFICE O/P EST MOD 30 MIN: CPT

## 2023-01-19 RX ORDER — LEVOTHYROXINE SODIUM 112 UG/1
TAKE 1 TABLET BY MOUTH EVERY MORNING ON EMPTY STOMACH TABLET ORAL
Qty: 30 | Refills: 0 | Status: ACTIVE | COMMUNITY
Start: 2011-02-28

## 2023-01-19 RX ORDER — FAMOTIDINE 40 MG/1
ONE TABLET TABLET, FILM COATED ORAL TWICE A DAY
Status: ACTIVE | COMMUNITY
Start: 2011-02-08

## 2023-01-19 RX ORDER — SODIUM, POTASSIUM,MAG SULFATES 17.5-3.13G
177ML SOLUTION, RECONSTITUTED, ORAL ORAL
Qty: 1 | OUTPATIENT
Start: 2023-01-19 | End: 2023-01-21

## 2023-01-19 RX ORDER — VERAPAMIL HYDROCHLORIDE 80 MG/1
TAKE 1 TABLET 3 TIMES DAILY TABLET ORAL
Refills: 0 | Status: ACTIVE | COMMUNITY
Start: 2014-07-01

## 2023-01-19 RX ORDER — LEVOTHYROXINE SODIUM 50 UG/1
1 TABLET IN THE MORNING ON AN EMPTY STOMACH TABLET ORAL ONCE A DAY
Status: ACTIVE | COMMUNITY

## 2023-01-19 RX ORDER — PANTOPRAZOLE SODIUM 40 MG/1
1 TABLET TABLET, DELAYED RELEASE ORAL ONCE A DAY
Status: ACTIVE | COMMUNITY

## 2023-01-19 RX ORDER — HYDROCODONE/ACETAMINOPHEN 10MG-325MG
TAKE 1 TO 2 TABLETS EVERY 6 HOURS AS NEEDED FOR PAIN TABLET ORAL
Refills: 0 | Status: ACTIVE | COMMUNITY

## 2023-01-19 RX ORDER — ONDANSETRON 4 MG/1
1 TABLET ON THE TONGUE AND ALLOW TO DISSOLVE TABLET, ORALLY DISINTEGRATING ORAL
Qty: 30 | Refills: 0 | OUTPATIENT
Start: 2023-01-19

## 2023-01-19 RX ORDER — ERGOCALCIFEROL 1.25 MG/1
2 CAPSULE CAPSULE ORAL
Status: ACTIVE | COMMUNITY

## 2023-01-19 RX ORDER — LEVOCETIRIZINE DIHYDROCHLORIDE 5 MG/1
1 TABLET IN THE EVENING TABLET ORAL ONCE A DAY
Qty: 30 | OUTPATIENT
Start: 2023-01-19 | End: 2023-02-18

## 2023-01-19 RX ORDER — ESTRADIOL 0.5 MG/1
TAKE 1 TABLET DAILY TABLET ORAL
Refills: 0 | Status: ACTIVE | COMMUNITY
Start: 2014-06-11

## 2023-01-19 RX ORDER — FLUTICASONE PROPIONATE 50 UG/1
1 SPRAY IN EACH NOSTRIL SPRAY, METERED NASAL ONCE A DAY
Status: ACTIVE | COMMUNITY

## 2023-01-19 RX ORDER — VENLAFAXINE HYDROCHLORIDE 75 MG/1
1 CAPSULE WITH FOOD CAPSULE, EXTENDED RELEASE ORAL ONCE A DAY
Status: ACTIVE | COMMUNITY

## 2023-01-19 RX ORDER — CARISOPRODOL 350 MG/1
TAKE 1 TABLET BY MOUTH AT BEDTIME TABLET ORAL TWICE A DAY
Refills: 0 | Status: ACTIVE | COMMUNITY
Start: 2011-02-01

## 2023-01-19 RX ORDER — SUCRALFATE 1 G/1
1 TABLET ON AN EMPTY STOMACH TABLET ORAL TWICE A DAY
Status: ACTIVE | COMMUNITY

## 2023-01-19 RX ORDER — GABAPENTIN 300 MG/1
5 CAPSULE CAPSULE ORAL ONCE A DAY
Status: ACTIVE | COMMUNITY

## 2023-01-19 NOTE — HPI-TODAY'S VISIT:
Ms. Chen is a 67-year-old woman presenting for follow-up after EGD. Last seen 11/2/2022 for epigastric abdominal pain, dysphagia, GERD, colon cancer screening.  She was to continue Protonix 40 mg daily, famotidine 40 mg at bedtime, Carafate twice daily and undergo EGD for further evaluation.  She has a maternal history of colon cancer and due for colon cancer screening, colonoscopy will be scheduled at her convenience. 12/27/2022 EGD:Irregular Z-line, otherwise normal examined esophagus.  No endoscopic abnormality to explain dysphagia, dilation to 16 mm with savory dilator.  Diffuse mildly erythematous mucosa without bleeding found in the gastric body, antrum, cardia, fundus.  Evidence of Jhon-en-Y gastrojejunostomy characterized by healthy-appearing mucosa, normal-appearing jejunum.  Pathology has not yet resulted. Today she presents to follow-up after EGD.  She reports her swallowing is better after dilation, but she is still feels like there is something in the back of her throat.  This could possibly be attributed to postnasal drip.  She is coughing and it does not seem to clear anything from her throat.  She is currently taking Protonix 40 mg once a day that does help with her heartburn and reflux, famotidine 40 mg at bedtime, and Carafate 2 times a day.  She reports nausea after eating and drinking and occasional vomiting she has right upper quadrant pain that comes and goes over the past years.  The pain is sharp and lasts hours.  She has to wait for this pain to subside.  It is worse with eating.  It can occur overnight and wake her from sleep.  She reports regular bowel movements taking MiraLAX with no blood, mucus, straining.  She did not have colonoscopy yet.

## 2023-01-20 PROBLEM — 235595009 GASTROESOPHAGEAL REFLUX DISEASE: Status: ACTIVE | Noted: 2023-01-20

## 2023-01-20 PROBLEM — 40739000 DYSPHAGIA: Status: ACTIVE | Noted: 2023-01-20

## 2023-01-23 PROBLEM — 428283002 HISTORY OF POLYP OF COLON (SITUATION): Status: ACTIVE | Noted: 2021-07-26

## 2023-02-21 ENCOUNTER — LAB OUTSIDE AN ENCOUNTER (OUTPATIENT)
Dept: URBAN - METROPOLITAN AREA CLINIC 113 | Facility: CLINIC | Age: 68
End: 2023-02-21

## 2023-02-21 ENCOUNTER — TELEPHONE ENCOUNTER (OUTPATIENT)
Dept: URBAN - METROPOLITAN AREA CLINIC 113 | Facility: CLINIC | Age: 68
End: 2023-02-21

## 2023-02-21 PROBLEM — 300331000: Status: ACTIVE | Noted: 2023-02-21

## 2023-02-21 RX ORDER — PANTOPRAZOLE SODIUM 40 MG/1
1 TABLET TABLET, DELAYED RELEASE ORAL ONCE A DAY
Status: ACTIVE | COMMUNITY

## 2023-02-21 RX ORDER — LEVOTHYROXINE SODIUM 50 UG/1
1 TABLET IN THE MORNING ON AN EMPTY STOMACH TABLET ORAL ONCE A DAY
Status: ACTIVE | COMMUNITY

## 2023-02-21 RX ORDER — LEVOTHYROXINE SODIUM 112 UG/1
TAKE 1 TABLET BY MOUTH EVERY MORNING ON EMPTY STOMACH TABLET ORAL
Qty: 30 | Refills: 0 | Status: ACTIVE | COMMUNITY
Start: 2011-02-28

## 2023-02-21 RX ORDER — FLUTICASONE PROPIONATE 50 UG/1
1 SPRAY IN EACH NOSTRIL SPRAY, METERED NASAL ONCE A DAY
Status: ACTIVE | COMMUNITY

## 2023-02-21 RX ORDER — FAMOTIDINE 40 MG/1
ONE TABLET TABLET, FILM COATED ORAL TWICE A DAY
Status: ACTIVE | COMMUNITY
Start: 2011-02-08

## 2023-02-21 RX ORDER — ONDANSETRON 4 MG/1
1 TABLET ON THE TONGUE AND ALLOW TO DISSOLVE TABLET, ORALLY DISINTEGRATING ORAL
Qty: 30 | Refills: 0 | Status: ACTIVE | COMMUNITY
Start: 2023-01-19

## 2023-02-21 RX ORDER — GABAPENTIN 300 MG/1
5 CAPSULE CAPSULE ORAL ONCE A DAY
Status: ACTIVE | COMMUNITY

## 2023-02-21 RX ORDER — CARISOPRODOL 350 MG/1
TAKE 1 TABLET BY MOUTH AT BEDTIME TABLET ORAL TWICE A DAY
Refills: 0 | Status: ACTIVE | COMMUNITY
Start: 2011-02-01

## 2023-02-21 RX ORDER — SUCRALFATE 1 G/1
1 TABLET ON AN EMPTY STOMACH TABLET ORAL TWICE A DAY
Status: ACTIVE | COMMUNITY

## 2023-02-21 RX ORDER — POLYETHYLENE GLYCOL 3350, SODIUM CHLORIDE, SODIUM BICARBONATE, POTASSIUM CHLORIDE 420; 11.2; 5.72; 1.48 G/4L; G/4L; G/4L; G/4L
1/2 PREP  @ 5-PM DAY BEFORE ADN 1/2 PREP IS 6 HOURS PRIOR POWDER, FOR SOLUTION ORAL
Qty: 4000 ML | Refills: 0 | OUTPATIENT
Start: 2023-02-24 | End: 2023-03-26

## 2023-02-21 RX ORDER — VENLAFAXINE HYDROCHLORIDE 75 MG/1
1 CAPSULE WITH FOOD CAPSULE, EXTENDED RELEASE ORAL ONCE A DAY
Status: ACTIVE | COMMUNITY

## 2023-02-21 RX ORDER — VERAPAMIL HYDROCHLORIDE 80 MG/1
TAKE 1 TABLET 3 TIMES DAILY TABLET ORAL
Refills: 0 | Status: ACTIVE | COMMUNITY
Start: 2014-07-01

## 2023-02-21 RX ORDER — ERGOCALCIFEROL 1.25 MG/1
2 CAPSULE CAPSULE ORAL
Status: ACTIVE | COMMUNITY

## 2023-02-21 RX ORDER — ESTRADIOL 0.5 MG/1
TAKE 1 TABLET DAILY TABLET ORAL
Refills: 0 | Status: ACTIVE | COMMUNITY
Start: 2014-06-11

## 2023-02-21 RX ORDER — HYDROCODONE/ACETAMINOPHEN 10MG-325MG
TAKE 1 TO 2 TABLETS EVERY 6 HOURS AS NEEDED FOR PAIN TABLET ORAL
Refills: 0 | Status: ACTIVE | COMMUNITY

## 2023-03-06 ENCOUNTER — CLAIMS CREATED FROM THE CLAIM WINDOW (OUTPATIENT)
Dept: URBAN - METROPOLITAN AREA CLINIC 4 | Facility: CLINIC | Age: 68
End: 2023-03-06
Payer: MEDICARE

## 2023-03-06 ENCOUNTER — OFFICE VISIT (OUTPATIENT)
Dept: URBAN - METROPOLITAN AREA SURGERY CENTER 25 | Facility: SURGERY CENTER | Age: 68
End: 2023-03-06
Payer: MEDICARE

## 2023-03-06 DIAGNOSIS — Z12.11 COLON CANCER SCREENING: ICD-10-CM

## 2023-03-06 DIAGNOSIS — D12.3 BENIGN NEOPLASM OF TRANSVERSE COLON: ICD-10-CM

## 2023-03-06 DIAGNOSIS — D12.3 ADENOMA OF TRANSVERSE COLON: ICD-10-CM

## 2023-03-06 PROCEDURE — 88305 TISSUE EXAM BY PATHOLOGIST: CPT | Performed by: PATHOLOGY

## 2023-03-06 PROCEDURE — G8907 PT DOC NO EVENTS ON DISCHARG: HCPCS | Performed by: INTERNAL MEDICINE

## 2023-03-06 PROCEDURE — 45385 COLONOSCOPY W/LESION REMOVAL: CPT | Performed by: INTERNAL MEDICINE

## 2023-03-06 RX ORDER — FLUTICASONE PROPIONATE 50 UG/1
1 SPRAY IN EACH NOSTRIL SPRAY, METERED NASAL ONCE A DAY
Status: ACTIVE | COMMUNITY

## 2023-03-06 RX ORDER — VERAPAMIL HYDROCHLORIDE 80 MG/1
TAKE 1 TABLET 3 TIMES DAILY TABLET ORAL
Refills: 0 | Status: ACTIVE | COMMUNITY
Start: 2014-07-01

## 2023-03-06 RX ORDER — ERGOCALCIFEROL 1.25 MG/1
2 CAPSULE CAPSULE ORAL
Status: ACTIVE | COMMUNITY

## 2023-03-06 RX ORDER — ESTRADIOL 0.5 MG/1
TAKE 1 TABLET DAILY TABLET ORAL
Refills: 0 | Status: ACTIVE | COMMUNITY
Start: 2014-06-11

## 2023-03-06 RX ORDER — PANTOPRAZOLE SODIUM 40 MG/1
1 TABLET TABLET, DELAYED RELEASE ORAL ONCE A DAY
Status: ACTIVE | COMMUNITY

## 2023-03-06 RX ORDER — GABAPENTIN 300 MG/1
5 CAPSULE CAPSULE ORAL ONCE A DAY
Status: ACTIVE | COMMUNITY

## 2023-03-06 RX ORDER — POLYETHYLENE GLYCOL 3350, SODIUM CHLORIDE, SODIUM BICARBONATE, POTASSIUM CHLORIDE 420; 11.2; 5.72; 1.48 G/4L; G/4L; G/4L; G/4L
1/2 PREP  @ 5-PM DAY BEFORE ADN 1/2 PREP IS 6 HOURS PRIOR POWDER, FOR SOLUTION ORAL
Qty: 4000 ML | Refills: 0 | Status: ACTIVE | COMMUNITY
Start: 2023-02-24 | End: 2023-03-26

## 2023-03-06 RX ORDER — SUCRALFATE 1 G/1
1 TABLET ON AN EMPTY STOMACH TABLET ORAL TWICE A DAY
Status: ACTIVE | COMMUNITY

## 2023-03-06 RX ORDER — CARISOPRODOL 350 MG/1
TAKE 1 TABLET BY MOUTH AT BEDTIME TABLET ORAL TWICE A DAY
Refills: 0 | Status: ACTIVE | COMMUNITY
Start: 2011-02-01

## 2023-03-06 RX ORDER — LEVOTHYROXINE SODIUM 50 UG/1
1 TABLET IN THE MORNING ON AN EMPTY STOMACH TABLET ORAL ONCE A DAY
Status: ACTIVE | COMMUNITY

## 2023-03-06 RX ORDER — LEVOTHYROXINE SODIUM 112 UG/1
TAKE 1 TABLET BY MOUTH EVERY MORNING ON EMPTY STOMACH TABLET ORAL
Qty: 30 | Refills: 0 | Status: ACTIVE | COMMUNITY
Start: 2011-02-28

## 2023-03-06 RX ORDER — FAMOTIDINE 40 MG/1
ONE TABLET TABLET, FILM COATED ORAL TWICE A DAY
Status: ACTIVE | COMMUNITY
Start: 2011-02-08

## 2023-03-06 RX ORDER — VENLAFAXINE HYDROCHLORIDE 75 MG/1
1 CAPSULE WITH FOOD CAPSULE, EXTENDED RELEASE ORAL ONCE A DAY
Status: ACTIVE | COMMUNITY

## 2023-03-06 RX ORDER — HYDROCODONE/ACETAMINOPHEN 10MG-325MG
TAKE 1 TO 2 TABLETS EVERY 6 HOURS AS NEEDED FOR PAIN TABLET ORAL
Refills: 0 | Status: ACTIVE | COMMUNITY

## 2023-03-06 RX ORDER — ONDANSETRON 4 MG/1
1 TABLET ON THE TONGUE AND ALLOW TO DISSOLVE TABLET, ORALLY DISINTEGRATING ORAL
Qty: 30 | Refills: 0 | Status: ACTIVE | COMMUNITY
Start: 2023-01-19

## 2023-04-10 ENCOUNTER — OFFICE VISIT (OUTPATIENT)
Dept: URBAN - METROPOLITAN AREA CLINIC 107 | Facility: CLINIC | Age: 68
End: 2023-04-10